# Patient Record
(demographics unavailable — no encounter records)

---

## 2024-11-26 NOTE — PLAN
[FreeTextEntry1] : Dizziness likely 2/2 mild orthostatic hypotension in the setting of dehydration. - patient counseled to drink plenty of fluids and to eat and drink throughout the day and make changes in position more slowly.  - RTC if symptoms worsen or become more frequent or are not responsive to above interventions

## 2024-11-26 NOTE — PHYSICAL EXAM
[No Acute Distress] : no acute distress [Well-Appearing] : well-appearing [Normal Sclera/Conjunctiva] : normal sclera/conjunctiva [PERRL] : pupils equal round and reactive to light [EOMI] : extraocular movements intact [No Respiratory Distress] : no respiratory distress  [No Accessory Muscle Use] : no accessory muscle use [Clear to Auscultation] : lungs were clear to auscultation bilaterally [Regular Rhythm] : with a regular rhythm [Normal S1, S2] : normal S1 and S2 [Soft] : abdomen soft [Non Tender] : non-tender [No Joint Swelling] : no joint swelling [Coordination Grossly Intact] : coordination grossly intact [No Focal Deficits] : no focal deficits [Normal Insight/Judgement] : insight and judgment were intact

## 2024-11-26 NOTE — REVIEW OF SYSTEMS
[Dizziness] : dizziness [Fever] : no fever [Chills] : no chills [Fatigue] : no fatigue [Discharge] : no discharge [Vision Problems] : no vision problems [Hearing Loss] : no hearing loss [Chest Pain] : no chest pain [Palpitations] : no palpitations [Shortness Of Breath] : no shortness of breath [Cough] : no cough [Abdominal Pain] : no abdominal pain [Nausea] : no nausea [Diarrhea] : diarrhea [Vomiting] : no vomiting [Dysuria] : no dysuria [Joint Pain] : no joint pain [Joint Stiffness] : no joint stiffness [Headache] : no headache [Fainting] : no fainting [Confusion] : no confusion

## 2024-11-26 NOTE — HISTORY OF PRESENT ILLNESS
[Spouse] : spouse [FreeTextEntry8] : 72 yo F PMH HTN, HLD, OA s/p  bilateral TKR, osteopenia presents today with complaint of dizziness. Patient that she was at physical therapy when she felt lightheaded. Room did not spin, no nausea or vomiting. This happened once or twice. Did not lose consciousness, did not fall. No recent illnesses, N/V/D, hearing/vision changes, memory changes, numbness/tingling. Patient does not drink a lot of water at baseline.

## 2025-01-06 NOTE — REASON FOR VISIT
[Follow-Up Visit] : a follow-up visit for [FreeTextEntry2] : S/p Right total knee arthroplasty with robotic assistance on 10/01/2024.

## 2025-01-06 NOTE — ADDENDUM
[FreeTextEntry1] : I, Teresa Pepper, acted solely as a scribe for Dr. Jt Reilly MD on this date 01/06/2025   All medical record entries made by the Scribe were at my, Dr. Jt Reilly MD, direction and personally dictated by me on 01/06/2025 .  I have reviewed the chart and agree that the record accurately reflects my personal performance of the history, physical exam, assessment and plan. I have also personally directed, reviewed, and agreed with the chart.

## 2025-01-06 NOTE — PHYSICAL EXAM
[Normal RLE] : Right Lower Extremity: No scars, rashes, lesions, ulcers, skin intact [Normal LLE] : Left Lower Extremity: No scars, rashes, lesions, ulcers, skin intact [Normal Touch] : sensation intact for touch [Normal] : Alert and in no acute distress [de-identified] : Right Lower Extremity o Knee :  Inspection/Palpation : no medial joint line tenderness to palpation, no lateral joint line tenderness to palpation, no effusion, no deformity, surgical incision well healed.   Range of Motion : 0 - 125 degrees, no crepitus  Stability : no valgus or varus instability present on provocative testing, Lachmans Test (-)  Strength : flexion and extension 5/5 Additional tests: o Muscle Bulk : normal muscle bulk present o Skin : no erythema, no ecchymosis o Sensation : sensation to pin intact o Vascular Exam : no edema, no cyanosis, dorsalis pedis artery pulse 2+, posterior tibial artery pulse 2+ [de-identified] : XR obtained on 10/17/2024 :   o RIGHT Knee : AP, lateral, and skyline views of the knee were obtained, there are no soft tissue abnormalities, no fractures, alignment is normal, normal appearing joint spaces, normal bone density, no bony lesions s/p total knee arthroplasty in good position and proper alignment. No signs of loosening.

## 2025-01-06 NOTE — PHYSICAL EXAM
[Normal RLE] : Right Lower Extremity: No scars, rashes, lesions, ulcers, skin intact [Normal LLE] : Left Lower Extremity: No scars, rashes, lesions, ulcers, skin intact [Normal Touch] : sensation intact for touch [Normal] : Alert and in no acute distress [de-identified] : Right Lower Extremity o Knee :  Inspection/Palpation : no medial joint line tenderness to palpation, no lateral joint line tenderness to palpation, no effusion, no deformity, surgical incision well healed.   Range of Motion : 0 - 125 degrees, no crepitus  Stability : no valgus or varus instability present on provocative testing, Lachmans Test (-)  Strength : flexion and extension 5/5 Additional tests: o Muscle Bulk : normal muscle bulk present o Skin : no erythema, no ecchymosis o Sensation : sensation to pin intact o Vascular Exam : no edema, no cyanosis, dorsalis pedis artery pulse 2+, posterior tibial artery pulse 2+ [de-identified] : XR obtained on 10/17/2024 :   o RIGHT Knee : AP, lateral, and skyline views of the knee were obtained, there are no soft tissue abnormalities, no fractures, alignment is normal, normal appearing joint spaces, normal bone density, no bony lesions s/p total knee arthroplasty in good position and proper alignment. No signs of loosening.

## 2025-01-06 NOTE — HISTORY OF PRESENT ILLNESS
[de-identified] : GIOVANNI PENA is a 73 year old female who presents for follow up evaluation of S/p Right total knee arthroplasty with robotic assistance on 10/01/2024. Patient states she is feeling good post operatively. She states she has some tingling along the surgical region. Patient presents ambulating with a cane. Pt uses cane when going out of the house, Pt states pain is moderate, she takes Tylenol. Patient denies fever, chills, nausea or vomiting. Pt participates with PT X2/week noting good improvements.

## 2025-01-06 NOTE — DISCUSSION/SUMMARY
[de-identified] : 74 yo female S/p Right total knee arthroplasty with robotic assistance on 10/01/2024, doing well.   Patient is to continue physical therapy. A prescription for Physical Therapy was provided.  Activity modifications and restrictions were discussed.  FU 3-6 months or prn  All questions were answered, all alternatives discussed and the patient is in complete agreement with that plan. Follow-up appointment as instructed. Any issues and the patient will call or come in sooner.

## 2025-01-06 NOTE — HISTORY OF PRESENT ILLNESS
[de-identified] : GIOVANNI PENA is a 73 year old female who presents for follow up evaluation of S/p Right total knee arthroplasty with robotic assistance on 10/01/2024. Patient states she is feeling good post operatively. She states she has some tingling along the surgical region. Patient presents ambulating with a cane. Pt uses cane when going out of the house, Pt states pain is moderate, she takes Tylenol. Patient denies fever, chills, nausea or vomiting. Pt participates with PT X2/week noting good improvements.

## 2025-01-06 NOTE — DISCUSSION/SUMMARY
[de-identified] : 72 yo female S/p Right total knee arthroplasty with robotic assistance on 10/01/2024, doing well.   Patient is to continue physical therapy. A prescription for Physical Therapy was provided.  Activity modifications and restrictions were discussed.  FU 3-6 months or prn  All questions were answered, all alternatives discussed and the patient is in complete agreement with that plan. Follow-up appointment as instructed. Any issues and the patient will call or come in sooner.

## 2025-04-02 NOTE — DISCUSSION/SUMMARY
[FreeTextEntry1] : 73-year-old female with bilateral hand pain and stiffness, left worse than right with severe osteoarthritis.  We discussed conservative management consisting of occupational hand therapy, steroid injections and splinting.  We also discussed surgical management with arthroplasty and fusion.  At this point she would like to try conservative management as there is no single digit that is bothering her more and she is frustrated with the stiffness to the left hand.  We discussed that it may not improve her symptoms but it is worth a try.  I would like her to see Acacia salgado [place].  Follow-up in 3 months.

## 2025-04-02 NOTE — HISTORY OF PRESENT ILLNESS
[FreeTextEntry1] : Left hand stiffness and advanced osteoarthritis to all digits  This is a very pleasant 73-year-old female presenting to me for bilateral hand stiffness and osteoarthritis that has been slowly developing over the past few decades.  She reports significant stiffness to the left hand where she is not able to make a full fist the most bothersome joints are her.  Left index and left middle finger.  She has not tried any conservative management.  She reports pain and stiffness worse in the morning and  with activities.  Denies any history of inflammatory disease or autoimmune disease.

## 2025-04-02 NOTE — PHYSICAL EXAM
[de-identified] : Left hand Deformity to all DIP and PIP joints Can almost make a full fist Full extension of the fingers noted Sensation intact light touch all distal fingertips [de-identified] : X-ray of bilateral hands were performed today, 4-25 which demonstrates severe osteoarthritis to DIP and PIP joints, left worse than right